# Patient Record
(demographics unavailable — no encounter records)

---

## 2025-03-25 NOTE — ASSESSMENT
[FreeTextEntry1] : 68-year-old female who presents for gross hematuria  Counseled the patient that next steps include urinalysis, urine culture, urine cytology.  We will send her for CT urogram as her kidney function is normal.  Lastly we will perform cystoscopy.

## 2025-03-25 NOTE — REASON FOR VISIT
[TextEntry] : 68-year-old female who presents for gross hematuria  Patient reports for the past month she has had intermittent gross hematuria.  She denies any dysuria urinary frequency urgency.  She denies blood with wiping.  She sees the blood in the toilet bowl.  She denies a correlation with morning voids.  She has a 20-pack-year smoking history.  She quit 20 years ago.  She denies chemical exposures.  At baseline she voids every 4-6 hours.  She has nocturia x 1.  She denies urgency or urge incontinence.  She denies stress incontinence.  She denies straining with bladder emptying.  She denies history of kidney stones or other urologic history.  She is a G1, P0.  She denies pelvic surgery.  She denies vaginal bulge or bothersome symptoms.  She occasionally has constipation.  Patient has a history of A-fib.  She is on Eliquis and baby aspirin  She endorses diabetes.  She is on metformin.  Her most recent A1c was 5.7%.  She uses a walker due to knee issues.  She denies back issues.  She has a history of meningioma.  Outside chart review: Urine culture - 1/2025 Creatinine 0.75 A1c 5.7% UA 3-10 RBC 1/2025

## 2025-03-25 NOTE — PHYSICAL EXAM
[Normal Appearance] : normal appearance [Well Groomed] : well groomed [General Appearance - In No Acute Distress] : no acute distress [] : no rash [Oriented To Time, Place, And Person] : oriented to person, place, and time [Affect] : the affect was normal [Mood] : the mood was normal [de-identified] : +walker